# Patient Record
Sex: FEMALE | Race: OTHER | NOT HISPANIC OR LATINO | Employment: UNEMPLOYED | URBAN - METROPOLITAN AREA
[De-identification: names, ages, dates, MRNs, and addresses within clinical notes are randomized per-mention and may not be internally consistent; named-entity substitution may affect disease eponyms.]

---

## 2023-04-26 ENCOUNTER — OFFICE VISIT (OUTPATIENT)
Dept: URGENT CARE | Facility: CLINIC | Age: 23
End: 2023-04-26

## 2023-04-26 VITALS
TEMPERATURE: 98.5 F | OXYGEN SATURATION: 97 % | SYSTOLIC BLOOD PRESSURE: 125 MMHG | HEART RATE: 89 BPM | DIASTOLIC BLOOD PRESSURE: 85 MMHG

## 2023-04-26 DIAGNOSIS — M54.42 ACUTE LEFT-SIDED LOW BACK PAIN WITH LEFT-SIDED SCIATICA: Primary | ICD-10-CM

## 2023-04-26 RX ORDER — MELOXICAM 15 MG/1
15 TABLET ORAL DAILY
COMMUNITY

## 2023-04-26 RX ORDER — CYCLOBENZAPRINE HCL 5 MG
TABLET ORAL
COMMUNITY
Start: 2023-04-19

## 2023-04-26 RX ORDER — PREDNISONE 20 MG/1
20 TABLET ORAL 2 TIMES DAILY WITH MEALS
Qty: 14 TABLET | Refills: 0 | Status: SHIPPED | OUTPATIENT
Start: 2023-04-26 | End: 2023-05-03

## 2023-04-26 RX ORDER — PREDNISONE 20 MG/1
20 TABLET ORAL DAILY
Qty: 5 TABLET | Refills: 0 | Status: SHIPPED | OUTPATIENT
Start: 2023-04-26 | End: 2023-04-26

## 2023-04-26 NOTE — PROGRESS NOTES
[address] [facility]        NAME: Elza Padron is a 23 y o  female  : 2000    MRN: [id number]  DATE: 2023  TIME: 2:22 PM    Assessment and Plan   Acute left-sided low back pain with left-sided sciatica [M54 42]  1  Acute left-sided low back pain with left-sided sciatica  Ambulatory Referral to Physical Therapy    Ambulatory Referral to Orthopedic Surgery    predniSONE 20 mg tablet    DISCONTINUED: predniSONE 20 mg tablet        Will try adding Prednisone for flare up of symptoms but given chronic nature of her pain I recommended Ortho and PT and placed referrals  Discussed that Ortho may do XR but I do not think she needs one at this time given unlikelihood of bony injury    Patient Instructions       Follow up with PCP in 3-5 days  Proceed to  ER if symptoms worsen  Chief Complaint     Chief Complaint   Patient presents with   • Back Pain     Pt  States about 2 weeks ago she started feeling lower back pain  Pt  Mentions that the mid-left back has a sharp pain that radiates to the right knee  Pt  Is taking meloxicam and cyclobenzapine but has not taken it today  History of Present Illness       Patient is a 24 yo female who presents for evaluation of back pain x 2 weeks  Pain began when she bent over and opened a drawer  The pain is located in her left lower back and radiates down to her left knee knee  She has had similar issues in the past and it flared up again these last two weeks  She has been taking Meloxicam and Flexeril with minimal relief  She states having been seen by her PCP for similar issues in the past and having been recommended to see a spinal specialist but she has not yet been able to  Denies numbness, weakness, tingling, gait problems, saddle anesthesia, and any bladder or bowel incontinence  Also denies fevers, nausea, vomiting, and any urinary symptoms such as dysuria, ferquency, urgency, hematuria           Review of Systems   Review of Systems   Constitutional: Negative for fever  Gastrointestinal: Negative for nausea and vomiting  Genitourinary: Negative for difficulty urinating, dysuria, frequency, hematuria and urgency  Musculoskeletal: Positive for back pain  Negative for gait problem  Neurological: Negative for weakness and numbness  Current Medications       Current Outpatient Medications:   •  cyclobenzaprine (FLEXERIL) 5 mg tablet, TAKE 1-2 TABLET(S) EVERY 8 HOURS AS NEEDED BY ORAL ROUTE FOR 5 DAYS, Disp: , Rfl:   •  meloxicam (MOBIC) 15 mg tablet, Take 15 mg by mouth daily, Disp: , Rfl:   •  predniSONE 20 mg tablet, Take 1 tablet (20 mg total) by mouth 2 (two) times a day with meals for 7 days, Disp: 14 tablet, Rfl: 0    Current Allergies     Allergies as of 04/26/2023   • (No Known Allergies)            The following portions of the patient's history were reviewed and updated as appropriate: allergies, current medications, past family history, past medical history, past social history, past surgical history and problem list      History reviewed  No pertinent past medical history  Past Surgical History:   Procedure Laterality Date   • BREAST SURGERY         History reviewed  No pertinent family history  Medications have been verified  Objective   /85   Pulse 89   Temp 98 5 °F (36 9 °C)   SpO2 97%        Physical Exam     Physical Exam  Constitutional:       General: She is not in acute distress  Appearance: She is not toxic-appearing  Cardiovascular:      Rate and Rhythm: Normal rate  Pulmonary:      Effort: Pulmonary effort is normal    Musculoskeletal:      Comments: Mild left lumbar paraspinal tenderness  Full ROM  Left SLR + Sensation intact distally  DP and PT pulses 2+   Skin:     General: Skin is warm and dry  Neurological:      Mental Status: She is alert     Psychiatric:         Mood and Affect: Mood normal          Behavior: Behavior normal